# Patient Record
Sex: FEMALE | Race: WHITE
[De-identification: names, ages, dates, MRNs, and addresses within clinical notes are randomized per-mention and may not be internally consistent; named-entity substitution may affect disease eponyms.]

---

## 2018-05-26 ENCOUNTER — HOSPITAL ENCOUNTER (EMERGENCY)
Dept: HOSPITAL 76 - ED | Age: 53
Discharge: HOME | End: 2018-05-26
Payer: COMMERCIAL

## 2018-05-26 VITALS — SYSTOLIC BLOOD PRESSURE: 104 MMHG | DIASTOLIC BLOOD PRESSURE: 74 MMHG

## 2018-05-26 DIAGNOSIS — K59.00: Primary | ICD-10-CM

## 2018-05-26 LAB
ALBUMIN DIAFP-MCNC: 4.6 G/DL (ref 3.2–5.5)
ALBUMIN/GLOB SERPL: 1.1 {RATIO} (ref 1–2.2)
ALP SERPL-CCNC: 56 IU/L (ref 42–121)
ALT SERPL W P-5'-P-CCNC: 14 IU/L (ref 10–60)
ANION GAP SERPL CALCULATED.4IONS-SCNC: 8 MMOL/L (ref 6–13)
AST SERPL W P-5'-P-CCNC: 21 IU/L (ref 10–42)
BASOPHILS NFR BLD AUTO: 0.1 10^3/UL (ref 0–0.1)
BASOPHILS NFR BLD AUTO: 2 %
BILIRUB BLD-MCNC: 0.6 MG/DL (ref 0.2–1)
BUN SERPL-MCNC: 10 MG/DL (ref 6–20)
CALCIUM UR-MCNC: 9.4 MG/DL (ref 8.5–10.3)
CHLORIDE SERPL-SCNC: 100 MMOL/L (ref 101–111)
CLARITY UR REFRACT.AUTO: CLEAR
CO2 SERPL-SCNC: 29 MMOL/L (ref 21–32)
CREAT SERPLBLD-SCNC: 0.7 MG/DL (ref 0.4–1)
EOSINOPHIL # BLD AUTO: 0.1 10^3/UL (ref 0–0.7)
EOSINOPHIL NFR BLD AUTO: 1.4 %
ERYTHROCYTE [DISTWIDTH] IN BLOOD BY AUTOMATED COUNT: 12.6 % (ref 12–15)
GFRSERPLBLD MDRD-ARVRAT: 89 ML/MIN/{1.73_M2} (ref 89–?)
GLOBULIN SER-MCNC: 4.1 G/DL (ref 2.1–4.2)
GLUCOSE SERPL-MCNC: 96 MG/DL (ref 70–100)
GLUCOSE UR QL STRIP.AUTO: NEGATIVE MG/DL
HCG UR QL: NEGATIVE
HGB UR QL STRIP: 15 G/DL (ref 12–16)
KETONES UR QL STRIP.AUTO: NEGATIVE MG/DL
LIPASE SERPL-CCNC: 37 U/L (ref 22–51)
LYMPHOCYTES # SPEC AUTO: 1.9 10^3/UL (ref 1.5–3.5)
LYMPHOCYTES NFR BLD AUTO: 39 %
MCH RBC QN AUTO: 31.2 PG (ref 27–31)
MCHC RBC AUTO-ENTMCNC: 33.9 G/DL (ref 32–36)
MCV RBC AUTO: 92.2 FL (ref 81–99)
MONOCYTES # BLD AUTO: 0.5 10^3/UL (ref 0–1)
MONOCYTES NFR BLD AUTO: 9.4 %
NEUTROPHILS # BLD AUTO: 2.4 10^3/UL (ref 1.5–6.6)
NEUTROPHILS # SNV AUTO: 4.9 X10^3/UL (ref 4.8–10.8)
NEUTROPHILS NFR BLD AUTO: 48.2 %
NITRITE UR QL STRIP.AUTO: NEGATIVE
PDW BLD AUTO: 7.6 FL (ref 7.9–10.8)
PH UR STRIP.AUTO: 6 PH (ref 5–7.5)
PLATELET # BLD: 260 10^3/UL (ref 130–450)
PROT SPEC-MCNC: 8.7 G/DL (ref 6.7–8.2)
PROT UR STRIP.AUTO-MCNC: NEGATIVE MG/DL
RBC # UR STRIP.AUTO: (no result) /UL
RBC # URNS HPF: (no result) /HPF (ref 0–5)
RBC MAR: 4.81 10^6/UL (ref 4.2–5.4)
SODIUM SERPLBLD-SCNC: 137 MMOL/L (ref 135–145)
SP GR UR STRIP.AUTO: 1.01 (ref 1–1.03)
SP GR UR STRIP.AUTO: 1.01 (ref 1–1.03)
SQUAMOUS URNS QL MICRO: (no result)
UROBILINOGEN UR QL STRIP.AUTO: (no result) E.U./DL
UROBILINOGEN UR STRIP.AUTO-MCNC: NEGATIVE MG/DL

## 2018-05-26 PROCEDURE — 99283 EMERGENCY DEPT VISIT LOW MDM: CPT

## 2018-05-26 PROCEDURE — 74176 CT ABD & PELVIS W/O CONTRAST: CPT

## 2018-05-26 PROCEDURE — 80053 COMPREHEN METABOLIC PANEL: CPT

## 2018-05-26 PROCEDURE — 96360 HYDRATION IV INFUSION INIT: CPT

## 2018-05-26 PROCEDURE — 96361 HYDRATE IV INFUSION ADD-ON: CPT

## 2018-05-26 PROCEDURE — 81003 URINALYSIS AUTO W/O SCOPE: CPT

## 2018-05-26 PROCEDURE — 81025 URINE PREGNANCY TEST: CPT

## 2018-05-26 PROCEDURE — 87086 URINE CULTURE/COLONY COUNT: CPT

## 2018-05-26 PROCEDURE — 36415 COLL VENOUS BLD VENIPUNCTURE: CPT

## 2018-05-26 PROCEDURE — 83690 ASSAY OF LIPASE: CPT

## 2018-05-26 PROCEDURE — 85025 COMPLETE CBC W/AUTO DIFF WBC: CPT

## 2018-05-26 PROCEDURE — 81001 URINALYSIS AUTO W/SCOPE: CPT

## 2018-05-26 NOTE — CT REPORT
EXAM:

CT ABDOMEN AND PELVIS

 

EXAM DATE: 5/26/2018 10:49 AM.

 

CLINICAL HISTORY: RLQ pain.

 

COMPARISONS: CT abdomen/pelvis 10/02/2009.

 

TECHNIQUE: Routine helical CT imaging was performed through the abdomen and pelvis. IV contrast: None
. Enteric contrast: No. Reconstructions: Coronal and sagittal.

 

In accordance with CT protocol optimization, one or more of the following dose reduction techniques w
ere utilized for this exam: automated exposure control, adjustment of mA and/or KV based on patient s
ize, or use of iterative reconstructive technique.

 

FINDINGS: 

Lung Bases: Unremarkable.

 

Liver: There is a 20 mm segment 8 hypodensity, grossly stable in size and suggestive of hemangioma on
 the prior contrast examination. No new liver lesions are evident.

 

Gallbladder/Bile Ducts: Cholecystectomy. No ductal enlargement.

 

Spleen: Normal.

 

Pancreas: Normal.

 

Adrenal Glands: Normal.

 

Kidneys: Normal. No masses or hydronephrosis.

 

Peritoneal Cavity/Bowel: Trace free pelvic fluid. No free air. Visualized bowel is unremarkable. Appe
ndix is not clearly visualized; no pericecal inflammatory changes are evident.

 

Pelvic Organs: Uterus and adnexal structures are grossly unremarkable.

 

Vasculature: No aneurysms or other significant abnormality.

 

Bones: No significant abnormality. Moderate disk degeneration at L5-S1, mild at the upper lumbar leve
ls.

 

Other: None.

 

IMPRESSION: 

1. No convincing acute abdominopelvic findings. No hydronephrosis or urinary calculi are evident. 

2. Grossly stable probable hepatic hemangioma.

3. Nonspecific trace free pelvic fluid. 

4. Cholecystectomy. 

5. Other findings as noted above.

 

[facility]

Referring Provider Line: [phone]

 

SITE ID: [id number]

## 2018-05-26 NOTE — ED PHYSICIAN DOCUMENTATION
PD HPI ABD PAIN





- Stated complaint


Stated Complaint: RLQ PX





- Chief complaint


Chief Complaint: Abd Pain





- History obtained from


History obtained from: Patient, Family





- History of Present Illness


Timing - onset: Enter  time (0900), Today


Timing - duration: Hours


Timing - details: Abrupt onset, Still present


Quality: Sharp, Pain


Location: RLQ


Improved by: Laying still


Worsened by: Position, Palpation


Associated symptoms: No: Fever, Nausea, Vomiting


Similar symptoms before: Has not had sx before


Recently seen: Not recently seen





- Additional information


Additional information: 





52-year-old female was well when she went to bed last night she woke up this 

morning had a cup of coffee went to get into the car to go get donuts with her 

 and she developed acute right lower quadrant abdominal pain.  She has 

pain with movement and she has pain with palpation.  She does not want to eat a 

donut right now.  She has not had fever or vomiting.





Review of Systems


Constitutional: denies: Fever


Eyes: denies: Decreased vision


Ears: denies: Ear pain


Nose: denies: Congestion


Throat: denies: Sore throat


Cardiac: denies: Chest pain / pressure, Palpitations


Respiratory: denies: Dyspnea, Cough


GI: reports: Abdominal Pain.  denies: Nausea, Vomiting, Constipation, Diarrhea


: denies: Dysuria, Frequency


Skin: denies: Rash


Musculoskeletal: denies: Neck pain, Back pain, Extremity pain


Neurologic: denies: Generalized weakness, Focal weakness, Numbness





PD PAST MEDICAL HISTORY





- Past Medical History


Past Medical History: Yes


Respiratory: Sleep apnea, CPAP use


Neuro: Migraines


: Other





- Past Surgical History


Past Surgical History: Yes


General: Cholecystectomy


/GYN:  section





- Allergies


Allergies/Adverse Reactions: 


 Allergies











Allergy/AdvReac Type Severity Reaction Status Date / Time


 


steri strips Allergy  Rash Uncoded 18 09:43














- Social History


Does the pt smoke?: No


Smoking Status: Never smoker


Does the pt have substance abuse?: No





- Immunizations


Immunizations are current?: Yes





- POLST


Patient has POLST: No





PD ED PE NORMAL





- Vitals


Vital signs reviewed: Yes (hypertensive )





- General


General: Alert and oriented X 3, No acute distress, Well developed/nourished





- HEENT


HEENT: Atraumatic, PERRL, EOMI





- Neck


Neck: Supple, no meningeal sign





- Cardiac


Cardiac: RRR, No murmur





- Respiratory


Respiratory: No respiratory distress, Clear bilaterally





- Abdomen


Abdomen: Soft, Other (Specific right lower quadrant tenderness without guarding 

or rebound tenderness. )





- Back


Back: No CVA TTP, No spinal TTP





- Derm


Derm: Normal color, Warm and dry, No rash





- Extremities


Extremities: No deformity, No edema





- Neuro


Neuro: Alert and oriented X 3, No motor deficit, No sensory deficit, Normal 

speech


Eye Opening: Spontaneous


Motor: Obeys Commands


Verbal: Oriented


GCS Score: 15





- Psych


Psych: Normal mood, Normal affect





Results





- Vitals


Vitals: 


 Vital Signs - 24 hr











  18





  09:31 11:21 13:59


 


Temperature 36.8 C  


 


Heart Rate 51 L 43 L 51 L


 


Respiratory 18 17 16





Rate   


 


Blood Pressure 143/99 H 104/70 104/74


 


O2 Saturation 100 100 100








 Oxygen











O2 Source                      Room air

















- Labs


Labs: 


 Laboratory Tests











  18





  09:52 09:52 12:42


 


WBC  4.9  


 


RBC  4.81  


 


Hgb  15.0  


 


Hct  44.3  


 


MCV  92.2  


 


MCH  31.2 H  


 


MCHC  33.9  


 


RDW  12.6  


 


Plt Count  260  


 


MPV  7.6 L  


 


Neut #  2.4  


 


Lymph #  1.9  


 


Mono #  0.5  


 


Eos #  0.1  


 


Baso #  0.1  


 


Absolute Nucleated RBC  0.00  


 


Nucleated RBC %  0.0  


 


Sodium   137 


 


Potassium   3.7 


 


Chloride   100 L 


 


Carbon Dioxide   29 


 


Anion Gap   8.0 


 


BUN   10 


 


Creatinine   0.7 


 


Estimated GFR (MDRD)   89 


 


Glucose   96 


 


Calcium   9.4 


 


Total Bilirubin   0.6 


 


AST   21 


 


ALT   14 


 


Alkaline Phosphatase   56 


 


Total Protein   8.7 H 


 


Albumin   4.6 


 


Globulin   4.1 


 


Albumin/Globulin Ratio   1.1 


 


Lipase   37 


 


Urine Color    YELLOW


 


Urine Clarity    CLEAR


 


Urine pH    6.0


 


Ur Specific Gravity    1.015


 


Urine Protein    NEGATIVE


 


Urine Glucose (UA)    NEGATIVE


 


Urine Ketones    NEGATIVE


 


Urine Occult Blood    SMALL H


 


Urine Nitrite    NEGATIVE


 


Urine Bilirubin    NEGATIVE


 


Urine Urobilinogen    0.2 (NORMAL)


 


Ur Leukocyte Esterase    NEGATIVE


 


Urine RBC    0-5


 


Urine WBC    0-3


 


Ur Squamous Epith Cells    MOD Squamous H


 


Urine Bacteria    Moderate H


 


Ur Microscopic Review    INDICATED


 


Urine Culture Comments    NOT INDICATED


 


Urine HCG, Qual   














  18





  12:42


 


WBC 


 


RBC 


 


Hgb 


 


Hct 


 


MCV 


 


MCH 


 


MCHC 


 


RDW 


 


Plt Count 


 


MPV 


 


Neut # 


 


Lymph # 


 


Mono # 


 


Eos # 


 


Baso # 


 


Absolute Nucleated RBC 


 


Nucleated RBC % 


 


Sodium 


 


Potassium 


 


Chloride 


 


Carbon Dioxide 


 


Anion Gap 


 


BUN 


 


Creatinine 


 


Estimated GFR (MDRD) 


 


Glucose 


 


Calcium 


 


Total Bilirubin 


 


AST 


 


ALT 


 


Alkaline Phosphatase 


 


Total Protein 


 


Albumin 


 


Globulin 


 


Albumin/Globulin Ratio 


 


Lipase 


 


Urine Color 


 


Urine Clarity 


 


Urine pH 


 


Ur Specific Gravity  1.015


 


Urine Protein 


 


Urine Glucose (UA) 


 


Urine Ketones 


 


Urine Occult Blood 


 


Urine Nitrite 


 


Urine Bilirubin 


 


Urine Urobilinogen 


 


Ur Leukocyte Esterase 


 


Urine RBC 


 


Urine WBC 


 


Ur Squamous Epith Cells 


 


Urine Bacteria 


 


Ur Microscopic Review 


 


Urine Culture Comments 


 


Urine HCG, Qual  NEGATIVE














- Rads (name of study)


  ** CT abdomen and pelvis without


Radiology: Prelim report reviewed (Impression: 1.  No convincing acute 

abdominal pelvic findings.  No hydronephrosis or urinary calculi are evident. 

2. Grossly stable probable left hepatic hemangioma.  3. Nonspecific trace free 

pelvic fluid. 4. Cholecystectomy. 5. Other findings as noted above.), EMP read 

indepedently (There is a lot of stool throughout the entire exam ), See rad 

report





Procedures





- Bedside sono


Bedside sono by EMP: 





With use of bedside ultrasound the right kidney is imaged there is no evidence 

of hydronephrosis and the kidney is sonographically nontender.





PD MEDICAL DECISION MAKING





- ED course


Complexity details: reviewed old records, reviewed results, re-evaluated patient

, considered differential, d/w patient, d/w family


ED course: 





52-year-old previously well female has had acute onset of right lower quadrant 

pain this morning and this pain has now waxed and waned.  She has some periods 

where she is completely pain-free and periods of severe pain.  On initial 

evaluation she was tender in the right lower quadrant and a CT scan of the 

abdomen pelvis is obtained which demonstrates a normal appendix and there is a 

lot of stool throughout the entire exam.  I suspect this is the reason for the 

patient's presentation I discussed with with her and given her dose of milk of 

magnesia.





Departure





- Departure


Disposition: 01 Home, Self Care


Clinical Impression: 


Constipation


Qualifiers:


 Constipation type: unspecified constipation type Qualified Code(s): K59.00 - 

Constipation, unspecified





Instructions:  ED Constipation


Follow-Up: 


CHINA POND PA-C [Primary Care Provider] - 


Discharge Date/Time: 18 14:07

## 2024-02-26 NOTE — CT PRELIMINARY REPORT
Accession: [id number]

Exam: CT ABDOMEN/PELVIS W/O

 

IMPRESSION: 

1. No convincing acute abdominopelvic findings. No hydronephrosis or urinary calculi are evident. 

2. Grossly stable probable hepatic hemangioma.

3. Nonspecific trace free pelvic fluid. 

4. Cholecystectomy. 

5. Other findings as noted above.

 

[facility]

 

SITE ID: [id number]
done